# Patient Record
Sex: FEMALE | Race: WHITE | NOT HISPANIC OR LATINO | Employment: UNEMPLOYED | URBAN - METROPOLITAN AREA
[De-identification: names, ages, dates, MRNs, and addresses within clinical notes are randomized per-mention and may not be internally consistent; named-entity substitution may affect disease eponyms.]

---

## 2017-05-15 ENCOUNTER — HOSPITAL ENCOUNTER (EMERGENCY)
Facility: HOSPITAL | Age: 52
Discharge: HOME | End: 2017-05-15
Attending: EMERGENCY MEDICINE
Payer: COMMERCIAL

## 2017-05-15 VITALS
OXYGEN SATURATION: 99 % | HEIGHT: 62 IN | HEART RATE: 70 BPM | DIASTOLIC BLOOD PRESSURE: 75 MMHG | WEIGHT: 183 LBS | TEMPERATURE: 98 F | SYSTOLIC BLOOD PRESSURE: 135 MMHG | RESPIRATION RATE: 18 BRPM | BODY MASS INDEX: 33.68 KG/M2

## 2017-05-15 DIAGNOSIS — L30.9 PERIORBITAL DERMATITIS: Primary | ICD-10-CM

## 2017-05-15 PROCEDURE — 25000003 PHARM REV CODE 250: Performed by: EMERGENCY MEDICINE

## 2017-05-15 PROCEDURE — 63600175 PHARM REV CODE 636 W HCPCS: Performed by: EMERGENCY MEDICINE

## 2017-05-15 PROCEDURE — 99283 EMERGENCY DEPT VISIT LOW MDM: CPT

## 2017-05-15 RX ORDER — LOSARTAN POTASSIUM 50 MG/1
50 TABLET ORAL DAILY
COMMUNITY

## 2017-05-15 RX ORDER — HYDROCODONE BITARTRATE AND ACETAMINOPHEN 5; 325 MG/1; MG/1
2 TABLET ORAL
Status: COMPLETED | OUTPATIENT
Start: 2017-05-15 | End: 2017-05-15

## 2017-05-15 RX ORDER — DIPHENHYDRAMINE HCL 50 MG
50 CAPSULE ORAL
Status: COMPLETED | OUTPATIENT
Start: 2017-05-15 | End: 2017-05-15

## 2017-05-15 RX ORDER — VALACYCLOVIR HYDROCHLORIDE 1 G/1
1000 TABLET, FILM COATED ORAL 2 TIMES DAILY
COMMUNITY

## 2017-05-15 RX ORDER — GABAPENTIN 300 MG/1
300 CAPSULE ORAL NIGHTLY
COMMUNITY

## 2017-05-15 RX ORDER — DIPHENHYDRAMINE HCL 50 MG
50 CAPSULE ORAL EVERY 6 HOURS PRN
Qty: 20 CAPSULE | Refills: 0 | Status: SHIPPED | OUTPATIENT
Start: 2017-05-15

## 2017-05-15 RX ORDER — PREDNISONE 20 MG/1
60 TABLET ORAL
Status: COMPLETED | OUTPATIENT
Start: 2017-05-15 | End: 2017-05-15

## 2017-05-15 RX ORDER — ESCITALOPRAM OXALATE 20 MG/1
20 TABLET ORAL DAILY
COMMUNITY

## 2017-05-15 RX ORDER — TIZANIDINE 2 MG/1
4 TABLET ORAL EVERY 6 HOURS PRN
COMMUNITY

## 2017-05-15 RX ORDER — DIAZEPAM 10 MG/1
10 TABLET ORAL NIGHTLY
COMMUNITY

## 2017-05-15 RX ORDER — PREDNISONE 20 MG/1
60 TABLET ORAL DAILY
Qty: 15 TABLET | Refills: 0 | Status: SHIPPED | OUTPATIENT
Start: 2017-05-15 | End: 2017-05-20

## 2017-05-15 RX ORDER — NEOMYCIN SULFATE, POLYMYXIN B SULFATE, AND DEXAMETHASONE 3.5; 10000; 1 MG/G; [USP'U]/G; MG/G
OINTMENT OPHTHALMIC 3 TIMES DAILY
COMMUNITY

## 2017-05-15 RX ADMIN — DIPHENHYDRAMINE HYDROCHLORIDE 50 MG: 50 CAPSULE ORAL at 06:05

## 2017-05-15 RX ADMIN — PREDNISONE 60 MG: 20 TABLET ORAL at 06:05

## 2017-05-15 RX ADMIN — HYDROCODONE BITARTRATE AND ACETAMINOPHEN 2 TABLET: 5; 325 TABLET ORAL at 06:05

## 2017-05-15 NOTE — ED PROVIDER NOTES
"Encounter Date: 5/15/2017    SCRIBE #1 NOTE: I, Dionicio Kennedy, am scribing for, and in the presence of, Ulises More MD. Other sections scribed: HPI, ROS.       History     Chief Complaint   Patient presents with    Herpes Zoster     facial/ eye pain  that began on Wednesday, dx with shingles on Friday at urgent care.  Began meds but no relief.      Review of patient's allergies indicates:   Allergen Reactions    Lisinopril Rash     HPI Comments: CC:  HPI: This 51 y.o. female smoker with HTN and Hx of L leg surgery, back surgery, hysterectomy presents to the ED c/o rash to bilateral orbits that first appeared as a "blister" near L eyebrow 5 days ago. She reprots that the rash then spread around her L eye and then to R eye. Pt states that she was dx'd with shingles at PCP 3 days ago, and was placed on a topical cream. Pt reports severe associated frontal HA as well as intermittent chills and a chronic cough. Son reports pt had here eyelashes worked on by a  3 days prior. Pt denies vision changes, eye pain, eye discharge, fever.    The history is provided by the patient.     Past Medical History:   Diagnosis Date    Anxiety and depression     Depression     GERD (gastroesophageal reflux disease)     Hypertension     Migraine headache      Past Surgical History:   Procedure Laterality Date    ANKLE FRACTURE SURGERY      BACK SURGERY      HYSTERECTOMY      LEG RECONSTRUCTION USING MUSCLE FLAP       History reviewed. No pertinent family history.  Social History   Substance Use Topics    Smoking status: Current Every Day Smoker     Packs/day: 1.00     Types: Cigarettes    Smokeless tobacco: None    Alcohol use Yes      Comment: social     Review of Systems   Constitutional: Negative for chills and fever.   HENT: Negative for ear pain and sore throat.    Eyes: Negative for discharge and visual disturbance.   Respiratory: Negative for cough and shortness of breath.    Gastrointestinal: Negative " for abdominal pain, nausea and vomiting.   Genitourinary: Negative for difficulty urinating and dysuria.   Musculoskeletal: Negative for myalgias.   Skin: Positive for rash (bilateral orbits).   Neurological: Positive for headaches.       Physical Exam   Initial Vitals   BP Pulse Resp Temp SpO2   05/15/17 0552 05/15/17 0552 05/15/17 0552 05/15/17 0552 05/15/17 0552   133/74 94 18 97.8 °F (36.6 °C) 98 %     Physical Exam  The patient was examined specifically for the following:   General:No significant distress, Good color, Warm and dry. Head and neck:Scalp atraumatic, Neck supple. Neurological:Appropriate conversation, Gross motor deficits. Eyes:Conjugate gaze, Clear corneas. ENT: No epistaxis. Cardiac: Regular rate and rhythm, Grossly normal heart tones. Pulmonary: Wheezing, Rales. Gastrointestinal: Abdominal tenderness, Abdominal distention. Musculoskeletal: Extremity deformity, Apparent pain with range of motion of the joints. Skin: Rash.   The findings on examination were normal except for the following: The patient has a red rash involving the upper eyelids bilaterally all the way to the base of the eyebrows.  The rash is completely symmetrical.  There are no vesicles.  There is swollen and erythematous skin.  Conjunctiva and cornea are clear  ED Course   Procedures  Labs Reviewed - No data to display       Medical decision making: Given the above, this patient was told that she has herpes zoster.  I'm confident this is not herpes zoster.  The rash is bilateral.  There are no vesicles.  I believe this is a dermatitis possibly from eye makeup.  I will treat the patient was systemic steroids because she is having trouble keeping creams and lotions out of her eyes.  She has burning pain.  I will treat her for pain with the Benadryl.  I will have her return if she gets worse or if new problems develop.  I doubt cellulitis.  I will ask the patient to avoid eye makeup.                Scribe Attestation:   Scribe #1:  I performed the above scribed service and the documentation accurately describes the services I performed. I attest to the accuracy of the note.    Attending Attestation:           Physician Attestation for Scribe:  Physician Attestation Statement for Scribe #1: I, Ulises More MD, reviewed documentation, as scribed by Dionicio Kennedy in my presence, and it is both accurate and complete.                 ED Course     Clinical Impression:   The encounter diagnosis was Periorbital dermatitis.          Ulises More MD  05/17/17 0658

## 2017-05-15 NOTE — DISCHARGE INSTRUCTIONS
Please follow-up with the dermatologist above later this week.  Please return immediately if you get worse or if new problems develop.  Please make an appointment to see your primary care doctor this week.  Rest.

## 2017-05-15 NOTE — ED TRIAGE NOTES
"Pt reports started with blister to left inner canthus of eye on Wednesday. Then saw [facility] on Friday and sent to opthomologist on Friday. Place on Valtrex TID and David-Poly-Dex ointment TID on Friday. Also advised to take Gabapentin that she had at home. Pt didn't like taking because it made her lethargic. Pt reports her mother has cancer and started with blisters on her back first. Here today because the pain, swelling and headache is getting worse and it is "making her nervous". + blurred vision.  "

## 2017-05-15 NOTE — ED AVS SNAPSHOT
[facility]  [address] [place] [address]               Gail Chin   5/15/2017  5:55 AM   ED    Description:  Female : 1965   Department:  [facility]           Your Care was Coordinated By:     Provider Role From To    Ulises More MD Attending Provider 05/15/17 0601 --      Reason for Visit     Herpes Zoster           Diagnoses this Visit        Comments    Periorbital dermatitis    -  Primary       ED Disposition     ED Disposition Condition Comment    Discharge             To Do List           Follow-up Information     Follow up with Natanael Welch MD In 3 days.    Specialty:  Dermatology    Contact information:    [address]  [address]  [facility] [place] [address]  [phone]         These Medications        Disp Refills Start End    predniSONE (DELTASONE) 20 MG tablet 15 tablet 0 5/15/2017 2017    Take 3 tablets (60 mg total) by mouth once daily. - Oral    Pharmacy: [facility] [address] [place] [address] [place] AT [facility] Ph #: [phone]       diphenhydrAMINE (BENADRYL) 50 MG capsule 20 capsule 0 5/15/2017     Take 1 capsule (50 mg total) by mouth every 6 (six) hours as needed (Eye redness and pain). - Oral    Pharmacy: [facility] [address] [place] [address] [place] [facility] Ph #: [phone]         [facility]     [facility] -  Assistance  Unless otherwise directed by your provider, please contact [facility], our nurse care line that is available for 24/ assistance.     Registered nurses in the [facility] provide: appointment scheduling, clinical advisement, health education, and other advisory services.  Call: [phone] (toll free)               Medications           Message regarding Medications     Verify the changes and/or additions to your medication regime  listed below are the same as discussed with your clinician today.  If any of these changes or additions are incorrect, please notify your healthcare provider.        START taking these NEW medications        Refills    predniSONE (DELTASONE) 20 MG tablet 0    Sig: Take 3 tablets (60 mg total) by mouth once daily.    Class: Print    Route: Oral    diphenhydrAMINE (BENADRYL) 50 MG capsule 0    Sig: Take 1 capsule (50 mg total) by mouth every 6 (six) hours as needed (Eye redness and pain).    Class: Print    Route: Oral      These medications were administered today        Dose Freq    hydrocodone-acetaminophen 5-325mg per tablet 2 tablet 2 tablet ED 1 Time    Sig: Take 2 tablets by mouth ED 1 Time.    Class: Normal    Route: Oral    predniSONE tablet 60 mg 60 mg ED 1 Time    Sig: Take 3 tablets (60 mg total) by mouth ED 1 Time.    Class: Normal    Route: Oral    diphenhydrAMINE capsule 50 mg 50 mg ED 1 Time    Sig: Take 1 capsule (50 mg total) by mouth ED 1 Time.    Class: Normal    Route: Oral           Verify that the below list of medications is an accurate representation of the medications you are currently taking.  If none reported, the list may be blank. If incorrect, please contact your healthcare provider. Carry this list with you in case of emergency.           Current Medications     diazePAM (VALIUM) 10 MG Tab Take 10 mg by mouth nightly.    escitalopram oxalate (LEXAPRO) 20 MG tablet Take 20 mg by mouth once daily.    gabapentin (NEURONTIN) 300 MG capsule Take 300 mg by mouth every evening.    losartan (COZAAR) 50 MG tablet Take 50 mg by mouth once daily.    neomycin-polymyxin-dexamethasone (DEXACINE) 3.5 mg/g-10,000 unit/g-0.1 % Oint 3 (three) times daily.    tizanidine (ZANAFLEX) 2 MG tablet Take 4 mg by mouth every 6 (six) hours as needed.    valacyclovir (VALTREX) 1000 MG tablet Take 1,000 mg by mouth 2 (two) times daily.    diphenhydrAMINE (BENADRYL) 50 MG capsule Take 1 capsule (50 mg total) by mouth  "every 6 (six) hours as needed (Eye redness and pain).    predniSONE (DELTASONE) 20 MG tablet Take 3 tablets (60 mg total) by mouth once daily.           Clinical Reference Information           Your Vitals Were     BP Pulse Temp Resp Height Weight    135/75 (BP Location: Right arm, Patient Position: Sitting, BP Method: Automatic) 70 98.2 °F (36.8 °C) (Oral) 18 5' 2" (1.575 m) 83 kg (183 lb)    SpO2 BMI             99% 33.47 kg/m2         Allergies as of 5/15/2017        Reactions    Lisinopril Rash      Immunizations Administered on Date of Encounter - 5/15/2017     None      ED Micro, Lab, POCT     None      ED Imaging Orders     None        Discharge Instructions       Please follow-up with the dermatologist above later this week.  Please return immediately if you get worse or if new problems develop.  Please make an appointment to see your primary care doctor this week.  Rest.    Discharge References/Attachments     HERPES EYE DISEASE, TREATMENT FOR (ENGLISH)    HERPES EYE DISEASE, UNDERSTANDING (ENGLISH)      [facility] Sign-Up     Activating your [facility] account is as easy as 1-2-3!     1) Visit [email or web address], select Sign Up Now, enter this activation code and your date of birth, then select Next.  [email or web address]  Expires: 6/29/2017  6:35 AM      2) Create a username and password to use when you visit [facility] in the future and select a security question in case you lose your password and select Next.    3) Enter your e-mail address and click Sign Up!    Additional Information  If you have questions, please e-mail [email or web address] or call [phone] to talk to our [facility] staff. Remember, [facility] is NOT to be used for urgent needs. For medical emergencies, dial 911.         Smoking Cessation     If you would like to quit smoking:   You may be eligible for free services if you are a [place] resident and started smoking cigarettes before September 1, 1988.  Call the Smoking Cessation Trust " (SCT) toll free at (718) 076-5955 or (328) 618-5089.   Call 1-800-QUIT-NOW if you do not meet the above criteria.   Contact us via email: [email or web address]   View our website for more information: [email or web address]         [facility] complies with applicable Federal civil rights laws and does not discriminate on the basis of race, color, national origin, age, disability, or sex.        Language Assistance Services     ATTENTION: Language assistance services are available, free of charge. Please call [phone].      ATENCIÓN: Si habla español, tiene a strong disposición servicios gratuitos de asistencia lingüística. Llame al [phone].     CHÚ Ý: N?u b?n nói Ti?ng Vi?t, có các d?ch v? h? tr? ngôn ng? mi?n phí dành cho b?n. G?i s? [phone].

## 2018-05-31 DIAGNOSIS — M48.061 LUMBAR STENOSIS: Primary | ICD-10-CM

## 2018-07-16 ENCOUNTER — DOCUMENTATION ONLY (OUTPATIENT)
Dept: REHABILITATION | Facility: HOSPITAL | Age: 53
End: 2018-07-16

## 2018-08-03 ENCOUNTER — CLINICAL SUPPORT (OUTPATIENT)
Dept: REHABILITATION | Facility: HOSPITAL | Age: 53
End: 2018-08-03
Attending: NURSE PRACTITIONER
Payer: MEDICARE

## 2018-08-03 DIAGNOSIS — G89.29 CHRONIC MIDLINE LOW BACK PAIN WITHOUT SCIATICA: ICD-10-CM

## 2018-08-03 DIAGNOSIS — M54.50 CHRONIC MIDLINE LOW BACK PAIN WITHOUT SCIATICA: ICD-10-CM

## 2018-08-03 PROCEDURE — 97161 PT EVAL LOW COMPLEX 20 MIN: CPT | Mod: PN

## 2018-08-03 PROCEDURE — G8978 MOBILITY CURRENT STATUS: HCPCS | Mod: CK,PN

## 2018-08-03 PROCEDURE — 97110 THERAPEUTIC EXERCISES: CPT | Mod: PN

## 2018-08-03 PROCEDURE — G8979 MOBILITY GOAL STATUS: HCPCS | Mod: CK,PN
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
## 2018-08-06 ENCOUNTER — DOCUMENTATION ONLY (OUTPATIENT)
Dept: REHABILITATION | Facility: HOSPITAL | Age: 53
End: 2018-08-06

## 2018-08-06 NOTE — PROGRESS NOTES
8/6/18    Patient did not attend today's visit for physical therapy at [facility].     She has another visit scheduled 8/13/18.  We will review our attendance policy with her at this time.

## 2018-08-16 NOTE — PROGRESS NOTES
[facility] Physical Therapy Treatment      Name: Gail Chin  Clinic Number: [id number]  Date of Treatment: 2018   Diagnosis:   Encounter Diagnosis   Name Primary?    Chronic midline low back pain without sciatica      Physician: Ricky Farooq FNP    Pain pattern determined: 1 PEN  Plan of care signed: sent 8-3-18  Time in: 3:00  Time Out: 4:15  Total Billable Units: 65  Precautions: standard  Visit #: 2      Subjective   Gail reports continued Low Back Pain and Left LE discomfort/tightness.  She reports that she has to take care of her mother and that she hasn't been able to make her appointments the last few weeks.  She has been performing her home exercises and using cold packs for relief of pain.    Patient reports tolerating previous visit well.  Patient reports their pain to be 5/10 on a 0-10 scale with 0 being no pain and 10 being the worst pain imaginable.  Pain Location: Bilateral lumbar spine, Left LE     Prior Treatment: Yes   Prior functional status: Yes   DME owned/used: No  Occupation:   Disability   Leisure:   Dance    Pts goals:  Decrease weight and teach ways to manage lower back pain.        Objective     Baseline Isometric Testing on Med X equipment: Testing administered by PT  Date of testin/3/2018   ROM  6 to 24 deg   Max Peak Torque 72   Min Peak Torque 23   Flex/Ext Ratio 3.1   % below normative data 58   Counter weight 504   femur 0   Seat pad 2         CMS Impairment/Limitation/Restriction for FOTO Lumbar Spine Survey  Status Limitation G-Code CMS Severity Modifier  Intake 47% 53% Current Status CK - At least 40 percent but less than 60 percent  Predicted 55% 45% Goal Status+ CK - At least 40 percent but less than 60 percent  D/C Status CK **only report if this is a one time visit  +Based on FOTO predicted change score      Treatment    Pt was instructed in and performed the following:     Gail received therapeutic exercises to develop/improved  posture, cardiovascular endurance, muscular endurance, lumbar/cervical ROM, strength and muscular endurance for 65 minutes including the following exercises:     LTR: 10x5 sec hold with swissball    PPT: 10x:5 sec hold  DKTC: 10x5 sec hold with swissball  Supine Hamstring stretch with strap 10:10 sec hold    [facility] 8/17/2018   Visit Number 2   VAS Pain Rating 5   Treadmill Time (in min.) 10   Speed 1.5   Incline 0   Lumbar Extension Seat Pad -   Femur Restraint -   Top Dead Center -   Counterweight -   Lumbar Flexion -   Lumbar Extension -   Lumbar Peak Torque -   Min Torque -   Percent From Norm -   Lumbar Weight 35   Repetitions 20   Rating of Perceived Exertion 4   Ice - Z Lie (in min.) 10           Torso and B UE peripheral muscle strengthening which included 1 set of 15-20 repetitions at a slow, controlled 7 second per rep pace focused on strengthening supporting musculature for improved body mechanics and functional mobility.  Pt and therapist focused on proper form during treatment to ensure optimal strengthening of each targeted muscle group.  B LE exercises will be added on her next visit.    Home Exercise Program as follows:     LTR: 10x5 sec hold with swissball    PPT: 10x:5 sec hold  DKTC: 10x5 sec hold with swissball  Supine Hamstring stretch with strap 10:10 sec hold    Handouts were given to the patient. Pt demo good understanding of the education provided. Gail demonstrated good return demonstration of activities.   Lumbar roll use compliance: will assess      Assessment       Patient is making good progress towards established goals.  She was able to perform 35 ft/lbs x 20 reps at RPE of 4.  She also performed very well with torso and peripheral strengthening exercises, with Verbal cues required for proper breathing technique.  Pt will continue to benefit from skilled outpatient physical therapy to address the deficits stated in the impairment chart, provide pt/family education and  to maximize pt's level of independence in the home and community environment.       Pt's spiritual, cultural and educational needs considered and pt agreeable to plan of care and goals as stated below:     GOALS: Pt is in agreement with the following goals.     Short term goals:  6 weeks or 10 visits   1.  Pt will demonstrate increased lumbar ROM by at least 3 degrees from the initial ROM value with improvements noted in functional ROM and ability to perform ADLs  2.  Pt will demonstrate increased maximum isometric torque value by 5% when compared to the initial value resulting in improved ability to perform bending, lifting, and carrying activities safely, confidently.  3.  Patient report a reduction in worst pain score by 1-2 points for improved tolerance during work and recreational activities  4.  Pt able to perform HEP correctly with minimal cueing or supervision for therapist        Long term goals: 13 weeks or 20 visits   1. Pt will demonstrate increased lumbar ROM by at least 10degrees from initial ROM value, resulting in improved ability to perform functional fwd bending while standing and sitting.   2. Pt will demonstrate increased maximum isometric torque value by 10% when compared to the initial value resulting in improved ability to perform bending, lifting, and carrying activities safely, confidently.  3. Pt to demonstrate ability to independently control and reduce their pain through posture positioning and mechanical movements throughout a typical day.  4.  Patient will demonstrate improved overall function per FOTO Survey to CJ = at least 20% but < 40% impaired, limited or restricted score or less.      Plan   Continue with established Plan of Care towards established PT goals.     Kevin Salas, PT   08/17/2018

## 2018-08-17 ENCOUNTER — CLINICAL SUPPORT (OUTPATIENT)
Dept: REHABILITATION | Facility: HOSPITAL | Age: 53
End: 2018-08-17
Attending: NURSE PRACTITIONER
Payer: MEDICARE

## 2018-08-17 DIAGNOSIS — M54.50 CHRONIC MIDLINE LOW BACK PAIN WITHOUT SCIATICA: ICD-10-CM

## 2018-08-17 DIAGNOSIS — G89.29 CHRONIC MIDLINE LOW BACK PAIN WITHOUT SCIATICA: ICD-10-CM

## 2018-08-17 PROCEDURE — 97110 THERAPEUTIC EXERCISES: CPT | Mod: PN

## 2018-08-20 ENCOUNTER — DOCUMENTATION ONLY (OUTPATIENT)
Dept: REHABILITATION | Facility: HOSPITAL | Age: 53
End: 2018-08-20

## 2018-08-22 ENCOUNTER — DOCUMENTATION ONLY (OUTPATIENT)
Dept: REHABILITATION | Facility: HOSPITAL | Age: 53
End: 2018-08-22

## 2018-08-22 NOTE — PROGRESS NOTES
Physical Therapy: No show/Cancellation of Visit  Date: 08/22/2018    Patient was a no show to today's PT appointment. Patient's next scheduled appointment is 8/27/2018.     Cancel: 2  No show: 3    Therapist: Zoila Freed PTA

## 2018-08-27 ENCOUNTER — CLINICAL SUPPORT (OUTPATIENT)
Dept: REHABILITATION | Facility: HOSPITAL | Age: 53
End: 2018-08-27
Attending: NURSE PRACTITIONER
Payer: MEDICARE

## 2018-08-27 DIAGNOSIS — G89.29 CHRONIC MIDLINE LOW BACK PAIN WITHOUT SCIATICA: ICD-10-CM

## 2018-08-27 DIAGNOSIS — M54.50 CHRONIC MIDLINE LOW BACK PAIN WITHOUT SCIATICA: ICD-10-CM

## 2018-08-27 PROCEDURE — 97110 THERAPEUTIC EXERCISES: CPT | Mod: PN | Performed by: PHYSICAL MEDICINE & REHABILITATION

## 2018-08-27 NOTE — PROGRESS NOTES
[facility]      Name: Gail Chin  Clinic Number: [id number]  Date of Treatment: 2018   Diagnosis:   Encounter Diagnosis   Name Primary?    Chronic midline low back pain without sciatica      Physician: Ricky Farooq FNP    Pain pattern determined: 1 PEN  Plan of care signed: sent 8-3-18  Time in: 3:00  Time Out: 4:15  Total Billable Units: 65  Precautions: standard  Visit #: 3      Subjective   Gail reports continued Low Back Pain and Left LE discomfort/tightness.  She reports that she has to take care of her mother and that she hasn't been able to make her appointments the last few weeks.  She has been performing her home exercises and using cold packs for relief of pain.    She feels the stress has increased her back pain.    Patient reports tolerating previous visit well.  Patient reports their pain to be 5/10 on a 0-10 scale with 0 being no pain and 10 being the worst pain imaginable.  Pain Location: Bilateral lumbar spine, Left LE     Prior Treatment: Yes   Prior functional status: Yes   DME owned/used: No  Occupation:   Disability   Leisure:   Dance    Pts goals:  Decrease weight and teach ways to manage lower back pain.        Objective     Baseline Isometric Testing on Med X equipment: Testing administered by PT  Date of testin/3/2018   ROM  6 to 24 deg   Max Peak Torque 72   Min Peak Torque 23   Flex/Ext Ratio 3.1   % below normative data 58   Counter weight 504   femur 0   Seat pad 2         CMS Impairment/Limitation/Restriction for FOTO Lumbar Spine Survey  Status Limitation G-Code CMS Severity Modifier  Intake 47% 53% Current Status CK - At least 40 percent but less than 60 percent  Predicted 55% 45% Goal Status+ CK - At least 40 percent but less than 60 percent  D/C Status CK **only report if this is a one time visit  +Based on FOTO predicted change score      Treatment    Pt was instructed in and performed the following:     Gail  received therapeutic exercises to develop/improved posture, cardiovascular endurance, muscular endurance, lumbar/cervical ROM, strength and muscular endurance for 65 minutes including the following exercises:     LTR: 10x5 sec hold with swissball    PPT: 10x:5 sec hold  DKTC: 10x5 sec hold with swissball  Supine Hamstring stretch with strap 10:10 sec hold    Reviewed and practiced ergonomics for lifting mom out of bed, practiced good body [facility] 8/27/2018   Visit Number 3   VAS Pain Rating 5   Treadmill Time (in min.) 10   Speed 1.5   Incline -   Flexion in Lying 10   Lumbar Extension Seat Pad -   Femur Restraint -   Top Dead Center -   Counterweight -   Lumbar Flexion 30   Lumbar Extension 6   Lumbar Peak Torque -   Min Torque -   Percent From Norm -   Lumbar Weight 35   Repetitions 20   Rating of Perceived Exertion 4   Ice - Z Lie (in min.) 10           Torso and B UE peripheral muscle strengthening which included 1 set of 15-20 repetitions at a slow, controlled 7 second per rep pace focused on strengthening supporting musculature for improved body mechanics and functional mobility.  Pt and therapist focused on proper form during treatment to ensure optimal strengthening of each targeted muscle group.  Patient performed torso rot, chest press, rowing, triceps, biceps, leg ext, leg curl, ip abd, hip add, leg press.      Home Exercise Program as follows:     LTR: 10x5 sec hold with swissball    PPT: 10x:5 sec hold  DKTC: 10x5 sec hold with swissball  Supine Hamstring stretch with strap 10:10 sec hold    Handouts were given to the patient. Pt demo good understanding of the education provided. Gail demonstrated good return demonstration of activities.   Lumbar roll use compliance: no but recommended      Assessment       Patient is making good progress towards established goals.  She was able to perform 35 ft/lbs x 20 reps at RPE of 4.  She also performed very well with torso and peripheral  strengthening exercises, with Verbal cues required for proper breathing technique.  Pt will continue to benefit from skilled outpatient physical therapy to address the deficits stated in the impairment chart, provide pt/family education and to maximize pt's level of independence in the home and community environment.   She has stress physically and mentally of care giving for parents.  Taught body mech for moving mom in and out of bed.  Review and ensure patient using good body mech as care giver.      Pt's spiritual, cultural and educational needs considered and pt agreeable to plan of care and goals as stated below:     GOALS: Pt is in agreement with the following goals.     Short term goals:  6 weeks or 10 visits   1.  Pt will demonstrate increased lumbar ROM by at least 3 degrees from the initial ROM value with improvements noted in functional ROM and ability to perform ADLs  2.  Pt will demonstrate increased maximum isometric torque value by 5% when compared to the initial value resulting in improved ability to perform bending, lifting, and carrying activities safely, confidently.  3.  Patient report a reduction in worst pain score by 1-2 points for improved tolerance during work and recreational activities  4.  Pt able to perform HEP correctly with minimal cueing or supervision for therapist        Long term goals: 13 weeks or 20 visits   1. Pt will demonstrate increased lumbar ROM by at least 10degrees from initial ROM value, resulting in improved ability to perform functional fwd bending while standing and sitting.   2. Pt will demonstrate increased maximum isometric torque value by 10% when compared to the initial value resulting in improved ability to perform bending, lifting, and carrying activities safely, confidently.  3. Pt to demonstrate ability to independently control and reduce their pain through posture positioning and mechanical movements throughout a typical day.  4.  Patient will demonstrate  improved overall function per FOTO Survey to CJ = at least 20% but < 40% impaired, limited or restricted score or less.      Plan   Continue with established Plan of Care towards established PT goals.     Radha Silvestre, PT   08/27/2018

## 2019-07-31 ENCOUNTER — HOSPITAL ENCOUNTER (EMERGENCY)
Facility: HOSPITAL | Age: 54
Discharge: HOME | End: 2019-07-31
Attending: EMERGENCY MEDICINE
Payer: MEDICARE

## 2019-07-31 VITALS
HEART RATE: 78 BPM | BODY MASS INDEX: 38.23 KG/M2 | TEMPERATURE: 98 F | WEIGHT: 209 LBS | RESPIRATION RATE: 18 BRPM | SYSTOLIC BLOOD PRESSURE: 122 MMHG | DIASTOLIC BLOOD PRESSURE: 78 MMHG | OXYGEN SATURATION: 99 %

## 2019-07-31 DIAGNOSIS — M54.2 NECK PAIN ON LEFT SIDE: ICD-10-CM

## 2019-07-31 DIAGNOSIS — R07.89 ATYPICAL CHEST PAIN: Primary | ICD-10-CM

## 2019-07-31 DIAGNOSIS — R07.9 CHEST PAIN: ICD-10-CM

## 2019-07-31 DIAGNOSIS — F17.200 TOBACCO DEPENDENCE: ICD-10-CM

## 2019-07-31 LAB
ALBUMIN SERPL-MCNC: 4 G/DL (ref 3.3–5.5)
ALP SERPL-CCNC: 66 U/L (ref 42–141)
BILIRUB SERPL-MCNC: 0.5 MG/DL (ref 0.2–1.6)
BILIRUBIN, POC UA: NEGATIVE
BLOOD, POC UA: NEGATIVE
BUN SERPL-MCNC: 10 MG/DL (ref 7–22)
CALCIUM SERPL-MCNC: 9.7 MG/DL (ref 8–10.3)
CHLORIDE SERPL-SCNC: 103 MMOL/L (ref 98–108)
CLARITY, POC UA: CLEAR
COLOR, POC UA: YELLOW
CREAT SERPL-MCNC: 0.8 MG/DL (ref 0.6–1.2)
GLUCOSE SERPL-MCNC: 97 MG/DL (ref 73–118)
GLUCOSE, POC UA: NEGATIVE
KETONES, POC UA: NEGATIVE
LEUKOCYTE EST, POC UA: NEGATIVE
NITRITE, POC UA: NEGATIVE
PH UR STRIP: 6.5 [PH]
POC ALT (SGPT): 33 U/L (ref 10–47)
POC AST (SGOT): 36 U/L (ref 11–38)
POC B-TYPE NATRIURETIC PEPTIDE: 5.9 PG/ML (ref 0–100)
POC CARDIAC TROPONIN I: 0 NG/ML
POC TCO2: 25 MMOL/L (ref 18–33)
POTASSIUM BLD-SCNC: 4.4 MMOL/L (ref 3.6–5.1)
PROTEIN, POC UA: NEGATIVE
PROTEIN, POC: 7.4 G/DL (ref 6.4–8.1)
SAMPLE: NORMAL
SODIUM BLD-SCNC: 143 MMOL/L (ref 128–145)
SPECIFIC GRAVITY, POC UA: 1.02
UROBILINOGEN, POC UA: 0.2 E.U./DL

## 2019-07-31 PROCEDURE — 96374 THER/PROPH/DIAG INJ IV PUSH: CPT | Mod: ER

## 2019-07-31 PROCEDURE — 80053 COMPREHEN METABOLIC PANEL: CPT | Mod: ER

## 2019-07-31 PROCEDURE — 63600175 PHARM REV CODE 636 W HCPCS: Mod: ER | Performed by: EMERGENCY MEDICINE

## 2019-07-31 PROCEDURE — 93005 ELECTROCARDIOGRAM TRACING: CPT | Mod: ER

## 2019-07-31 PROCEDURE — 93010 ELECTROCARDIOGRAM REPORT: CPT | Mod: ,,, | Performed by: INTERNAL MEDICINE

## 2019-07-31 PROCEDURE — 25000003 PHARM REV CODE 250: Mod: ER | Performed by: EMERGENCY MEDICINE

## 2019-07-31 PROCEDURE — 85025 COMPLETE CBC W/AUTO DIFF WBC: CPT | Mod: ER

## 2019-07-31 PROCEDURE — 83880 ASSAY OF NATRIURETIC PEPTIDE: CPT | Mod: ER

## 2019-07-31 PROCEDURE — 93010 EKG 12-LEAD: ICD-10-PCS | Mod: ,,, | Performed by: INTERNAL MEDICINE

## 2019-07-31 PROCEDURE — 81003 URINALYSIS AUTO W/O SCOPE: CPT | Mod: ER

## 2019-07-31 PROCEDURE — 84484 ASSAY OF TROPONIN QUANT: CPT | Mod: ER

## 2019-07-31 PROCEDURE — 99284 EMERGENCY DEPT VISIT MOD MDM: CPT | Mod: 25,ER

## 2019-07-31 RX ORDER — INDOMETHACIN 25 MG/1
50 CAPSULE ORAL
Qty: 30 CAPSULE | Refills: 0 | Status: SHIPPED | OUTPATIENT
Start: 2019-07-31

## 2019-07-31 RX ORDER — ASPIRIN 325 MG
325 TABLET ORAL
Status: COMPLETED | OUTPATIENT
Start: 2019-07-31 | End: 2019-07-31

## 2019-07-31 RX ORDER — KETOROLAC TROMETHAMINE 30 MG/ML
30 INJECTION, SOLUTION INTRAMUSCULAR; INTRAVENOUS
Status: COMPLETED | OUTPATIENT
Start: 2019-07-31 | End: 2019-07-31

## 2019-07-31 RX ADMIN — KETOROLAC TROMETHAMINE 30 MG: 30 INJECTION, SOLUTION INTRAMUSCULAR; INTRAVENOUS at 10:07

## 2019-07-31 RX ADMIN — ASPIRIN 325 MG ORAL TABLET 325 MG: 325 PILL ORAL at 10:07

## 2019-07-31 NOTE — DISCHARGE INSTRUCTIONS
Call your doctor for close follow-up.  Continue your pain medicine as directed.  Do not smoke at all
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
## 2020-03-24 PROBLEM — G89.29 CHRONIC MIDLINE LOW BACK PAIN WITHOUT SCIATICA: Status: RESOLVED | Noted: 2018-08-03 | Resolved: 2020-03-24

## 2020-03-24 PROBLEM — M54.50 CHRONIC MIDLINE LOW BACK PAIN WITHOUT SCIATICA: Status: RESOLVED | Noted: 2018-08-03 | Resolved: 2020-03-24

## 2021-03-30 ENCOUNTER — IMMUNIZATION (OUTPATIENT)
Dept: OBSTETRICS AND GYNECOLOGY | Facility: CLINIC | Age: 56
End: 2021-03-30
Payer: MEDICARE

## 2021-03-30 DIAGNOSIS — Z23 NEED FOR VACCINATION: Primary | ICD-10-CM

## 2021-03-30 PROCEDURE — 91300 COVID-19, MRNA, LNP-S, PF, 30 MCG/0.3 ML DOSE VACCINE: CPT | Mod: PBBFAC | Performed by: NURSE PRACTITIONER

## 2021-04-20 ENCOUNTER — IMMUNIZATION (OUTPATIENT)
Dept: OBSTETRICS AND GYNECOLOGY | Facility: CLINIC | Age: 56
End: 2021-04-20

## 2021-04-20 DIAGNOSIS — Z23 NEED FOR VACCINATION: Primary | ICD-10-CM

## 2021-04-20 PROCEDURE — 91300 COVID-19, MRNA, LNP-S, PF, 30 MCG/0.3 ML DOSE VACCINE: CPT | Mod: ,,, | Performed by: FAMILY MEDICINE

## 2021-04-20 PROCEDURE — 0002A COVID-19, MRNA, LNP-S, PF, 30 MCG/0.3 ML DOSE VACCINE: CPT | Mod: CV19,,, | Performed by: FAMILY MEDICINE

## 2021-04-20 PROCEDURE — 0002A COVID-19, MRNA, LNP-S, PF, 30 MCG/0.3 ML DOSE VACCINE: ICD-10-PCS | Mod: CV19,,, | Performed by: FAMILY MEDICINE

## 2021-04-20 PROCEDURE — 91300 COVID-19, MRNA, LNP-S, PF, 30 MCG/0.3 ML DOSE VACCINE: ICD-10-PCS | Mod: ,,, | Performed by: FAMILY MEDICINE
